# Patient Record
Sex: MALE | Race: OTHER | URBAN - METROPOLITAN AREA
[De-identification: names, ages, dates, MRNs, and addresses within clinical notes are randomized per-mention and may not be internally consistent; named-entity substitution may affect disease eponyms.]

---

## 2022-07-09 ENCOUNTER — TELEPHONE ENCOUNTER (OUTPATIENT)
Dept: URBAN - METROPOLITAN AREA CLINIC 121 | Facility: CLINIC | Age: 47
End: 2022-07-09

## 2022-07-10 ENCOUNTER — TELEPHONE ENCOUNTER (OUTPATIENT)
Dept: URBAN - METROPOLITAN AREA CLINIC 121 | Facility: CLINIC | Age: 47
End: 2022-07-10

## 2023-01-19 ENCOUNTER — WEB ENCOUNTER (OUTPATIENT)
Dept: URBAN - METROPOLITAN AREA CLINIC 63 | Facility: CLINIC | Age: 48
End: 2023-01-19

## 2023-01-25 ENCOUNTER — LAB OUTSIDE AN ENCOUNTER (OUTPATIENT)
Dept: URBAN - METROPOLITAN AREA CLINIC 63 | Facility: CLINIC | Age: 48
End: 2023-01-25

## 2023-01-25 ENCOUNTER — OFFICE VISIT (OUTPATIENT)
Dept: URBAN - METROPOLITAN AREA CLINIC 63 | Facility: CLINIC | Age: 48
End: 2023-01-25
Payer: COMMERCIAL

## 2023-01-25 VITALS
SYSTOLIC BLOOD PRESSURE: 128 MMHG | BODY MASS INDEX: 33.8 KG/M2 | WEIGHT: 255 LBS | DIASTOLIC BLOOD PRESSURE: 80 MMHG | OXYGEN SATURATION: 95 % | HEIGHT: 73 IN | TEMPERATURE: 97.6 F | HEART RATE: 87 BPM

## 2023-01-25 DIAGNOSIS — Z12.11 COLON CANCER SCREENING: ICD-10-CM

## 2023-01-25 DIAGNOSIS — K76.0 FATTY LIVER: ICD-10-CM

## 2023-01-25 DIAGNOSIS — R74.8 ABNORMAL LIVER ENZYMES: ICD-10-CM

## 2023-01-25 DIAGNOSIS — K21.9 GASTROESOPHAGEAL REFLUX DISEASE WITHOUT ESOPHAGITIS: ICD-10-CM

## 2023-01-25 PROCEDURE — 99204 OFFICE O/P NEW MOD 45 MIN: CPT | Performed by: PHYSICIAN ASSISTANT

## 2023-01-25 RX ORDER — ONDANSETRON HYDROCHLORIDE 4 MG/1
1 TABLET TABLET, FILM COATED ORAL
Qty: 2 | Refills: 0 | OUTPATIENT

## 2023-01-25 RX ORDER — POLYETHYLENE GLYCOL 3350, SODIUM CHLORIDE, SODIUM BICARBONATE, POTASSIUM CHLORIDE 420; 11.2; 5.72; 1.48 G/4L; G/4L; G/4L; G/4L
AS DIRECTED POWDER, FOR SOLUTION ORAL ONCE
Qty: 4000 | Refills: 0 | OUTPATIENT

## 2023-01-25 RX ORDER — FAMOTIDINE 20 MG/1
1 TABLET AS NEEDED TABLET, FILM COATED ORAL ONCE A DAY
Qty: 90 TABLET | Refills: 1 | OUTPATIENT

## 2023-01-25 NOTE — HPI-TODAY'S VISIT:
Devyn is a pleasant 47-year-old male who presents today for evaluation of abnormal liver enzymes  Labs dated 1/17/2023 showed a normal hemoglobin.  Platelets normal.  Normal renal function.  AST 41, ALT 82.  Alk phos and bilirubin normal.  TSH normal.  CT renal stone protocol in October 2017 showed no renal utero calculi.  No hydronephrosis.  Fatty infiltration of the liver.  Ultrasound dated 6/29/2022 showed hepatic steatosis  Denies EtOH use. Patient is colonoscopy naive. Notes 1-2 daily bowel movements without constipation or diarrhea. Chronic reflux and heartburn, not taking antacids. Denies nausea, vomiting, dysphagia, odynophagia, hematemesis, coffee ground emesis, abdominal pain, change in bowel habits, abnormal weight loss, BRBPR or melena. Denies family history of colon cancer or colon polyps. No other GI complaints at this time

## 2023-02-01 ENCOUNTER — LAB OUTSIDE AN ENCOUNTER (OUTPATIENT)
Dept: URBAN - METROPOLITAN AREA CLINIC 63 | Facility: CLINIC | Age: 48
End: 2023-02-01

## 2023-02-01 ENCOUNTER — TELEPHONE ENCOUNTER (OUTPATIENT)
Dept: URBAN - METROPOLITAN AREA CLINIC 63 | Facility: CLINIC | Age: 48
End: 2023-02-01

## 2023-02-01 LAB
IMMUNOGLOBULIN A: 269
IMMUNOGLOBULIN G: 1057
IMMUNOGLOBULIN M: 24

## 2023-02-05 LAB
A/G RATIO: 2
ABSOLUTE BASOPHILS: 39
ABSOLUTE EOSINOPHILS: 104
ABSOLUTE LYMPHOCYTES: 2074
ABSOLUTE MONOCYTES: 312
ABSOLUTE NEUTROPHILS: 3972
ACTIN (SMOOTH MUSCLE) ANTIBODY (IGG): <20
ALBUMIN: 4.7
ALKALINE PHOSPHATASE: 54
ALT (SGPT): 76
ANA SCREEN, IFA: NEGATIVE
AST (SGOT): 35
BASOPHILS: 0.6
BILIRUBIN, TOTAL: 0.9
BUN/CREATININE RATIO: (no result)
BUN: 15
CALCIUM: 9.8
CARBON DIOXIDE, TOTAL: 26
CERULOPLASMIN: 23
CHLORIDE: 103
CREATININE: 0.96
EGFR: 97
EOSINOPHILS: 1.6
FERRITIN, SERUM: 333
GLOBULIN, TOTAL: 2.3
GLUCOSE: 147
HEMATOCRIT: 45.3
HEMOGLOBIN: 15.4
HEP A AB, IGM: (no result)
HEP B CORE AB, IGM: (no result)
HEPATITIS A AB, TOTAL: REACTIVE
HEPATITIS B CORE AB TOTAL: (no result)
HEPATITIS B SURFACE AB IMMUNITY, QN: <5
HEPATITIS B SURFACE ANTIGEN: (no result)
HEPATITIS C ANTIBODY: (no result)
INR: 1
IRON BIND.CAP.(TIBC): 358
IRON SATURATION: 38
IRON: 135
LKM-1 ANTIBODY (IGG): <=20
LYMPHOCYTES: 31.9
MCH: 30.6
MCHC: 34
MCV: 89.9
MITOCHONDRIAL (M2) ANTIBODY: <=20
MONOCYTES: 4.8
MPV: 8.9
NEUTROPHILS: 61.1
PLATELET COUNT: 221
POTASSIUM: 4.1
PROTEIN, TOTAL: 7
PT: 10
RDW: 12.4
RED BLOOD CELL COUNT: 5.04
RHEUMATOID FACTOR: <14
SIGNAL TO CUT-OFF: 0.04
SJOGREN'S ANTIBODY (SS-A): (no result)
SJOGREN'S ANTIBODY (SS-B): (no result)
SODIUM: 140
WHITE BLOOD CELL COUNT: 6.5

## 2023-02-08 ENCOUNTER — WEB ENCOUNTER (OUTPATIENT)
Dept: URBAN - METROPOLITAN AREA SURGERY CENTER 4 | Facility: SURGERY CENTER | Age: 48
End: 2023-02-08

## 2023-02-10 ENCOUNTER — CLAIMS CREATED FROM THE CLAIM WINDOW (OUTPATIENT)
Dept: URBAN - METROPOLITAN AREA SURGERY CENTER 4 | Facility: SURGERY CENTER | Age: 48
End: 2023-02-10
Payer: COMMERCIAL

## 2023-02-10 DIAGNOSIS — K21.9 GASTROESOPHAGEAL REFLUX DISEASE WITHOUT ESOPHAGITIS: ICD-10-CM

## 2023-02-10 DIAGNOSIS — K22.89 OTHER SPECIFIED DISEASE OF ESOPHAGUS: ICD-10-CM

## 2023-02-10 DIAGNOSIS — K64.0 GRADE I INTERNAL HEMORRHOIDS: ICD-10-CM

## 2023-02-10 DIAGNOSIS — Z12.11 COLON CANCER SCREENING: ICD-10-CM

## 2023-02-10 DIAGNOSIS — K22.2 SCHATZKI'S RING: ICD-10-CM

## 2023-02-10 DIAGNOSIS — K29.70 GASTRITIS: ICD-10-CM

## 2023-02-10 DIAGNOSIS — K57.30 DIVERTCULOSIS OF LG INT W/O PERFORATION OR ABSCESS W/O BLEEDING: ICD-10-CM

## 2023-02-10 PROCEDURE — 43239 EGD BIOPSY SINGLE/MULTIPLE: CPT | Performed by: INTERNAL MEDICINE

## 2023-02-10 PROCEDURE — G0121 COLON CA SCRN NOT HI RSK IND: HCPCS | Performed by: INTERNAL MEDICINE

## 2023-02-10 RX ORDER — ONDANSETRON HYDROCHLORIDE 4 MG/1
1 TABLET TABLET, FILM COATED ORAL
Qty: 2 | Refills: 0 | Status: ACTIVE | COMMUNITY

## 2023-02-10 RX ORDER — POLYETHYLENE GLYCOL 3350, SODIUM CHLORIDE, SODIUM BICARBONATE, POTASSIUM CHLORIDE 420; 11.2; 5.72; 1.48 G/4L; G/4L; G/4L; G/4L
AS DIRECTED POWDER, FOR SOLUTION ORAL ONCE
Qty: 4000 | Refills: 0 | Status: ACTIVE | COMMUNITY

## 2023-02-10 RX ORDER — FAMOTIDINE 20 MG/1
1 TABLET AS NEEDED TABLET, FILM COATED ORAL ONCE A DAY
Qty: 90 TABLET | Refills: 1 | Status: ACTIVE | COMMUNITY

## 2023-02-13 PROBLEM — 398050005 DIVERTICULAR DISEASE OF COLON: Status: ACTIVE | Noted: 2023-02-13

## 2023-02-13 PROBLEM — 66889002 SCHATZKI'S RING: Status: ACTIVE | Noted: 2023-02-13

## 2023-02-13 PROBLEM — 4556007 GASTRITIS: Status: ACTIVE | Noted: 2023-02-13

## 2023-03-03 ENCOUNTER — OFFICE VISIT (OUTPATIENT)
Dept: URBAN - METROPOLITAN AREA CLINIC 63 | Facility: CLINIC | Age: 48
End: 2023-03-03
Payer: COMMERCIAL

## 2023-03-03 VITALS
SYSTOLIC BLOOD PRESSURE: 126 MMHG | DIASTOLIC BLOOD PRESSURE: 84 MMHG | WEIGHT: 249.8 LBS | HEIGHT: 73 IN | OXYGEN SATURATION: 96 % | TEMPERATURE: 97.8 F | BODY MASS INDEX: 33.11 KG/M2 | HEART RATE: 102 BPM

## 2023-03-03 DIAGNOSIS — K22.70 BARRETT'S ESOPHAGUS WITHOUT DYSPLASIA: ICD-10-CM

## 2023-03-03 DIAGNOSIS — R76.8 LOW SERUM IGM FOR AGE: ICD-10-CM

## 2023-03-03 DIAGNOSIS — K21.9 GASTROESOPHAGEAL REFLUX DISEASE WITHOUT ESOPHAGITIS: ICD-10-CM

## 2023-03-03 DIAGNOSIS — Z12.11 COLON CANCER SCREENING: ICD-10-CM

## 2023-03-03 DIAGNOSIS — R74.8 ABNORMAL LIVER ENZYMES: ICD-10-CM

## 2023-03-03 DIAGNOSIS — K76.0 FATTY LIVER: ICD-10-CM

## 2023-03-03 PROCEDURE — 99214 OFFICE O/P EST MOD 30 MIN: CPT | Performed by: PHYSICIAN ASSISTANT

## 2023-03-03 RX ORDER — MULTIVIT-MIN/IRON/FOLIC ACID/K 18-600-40
AS DIRECTED CAPSULE ORAL
Status: ACTIVE | COMMUNITY

## 2023-03-03 RX ORDER — MAGNESIUM OXIDE/MAG AA CHELATE 300 MG
1 CAPSULE WITH A MEAL CAPSULE ORAL ONCE A DAY
Status: ACTIVE | COMMUNITY

## 2023-03-03 RX ORDER — B-COMPLEX WITH VITAMIN C
1 TABLET TABLET ORAL ONCE A DAY
Status: ACTIVE | COMMUNITY

## 2023-03-03 RX ORDER — OMEPRAZOLE 20 MG/1
1 CAPSULE 30 MINUTES BEFORE MORNING MEAL CAPSULE, DELAYED RELEASE ORAL ONCE A DAY
Qty: 90 CAPSULE | Refills: 3 | OUTPATIENT

## 2023-03-03 NOTE — HPI-TODAY'S VISIT:
Devyn is a pleasant 47-year-old male who presents today for a procedure follow up.  Ultrasound dated 1/31/2023 showed hepatomegaly and severe diffuse fatty infiltration of the liver.  Pancreas obscured by overlying bowel gas.    FibroScan dated 1/31/2023 showed S3 and F0  Labs dated 2/1/2023 showed IgM 24 but otherwise normal immunoglobulins.  Iron studies normal.  Ferritin normal.  No immunity to hep B.  Glucose 147, ALT 76 otherwise normal CMP.  Normal CBC.  PT/INR normal.  Hep A antibody total reactive but IgM negative.  HCV negative.  Remaining autoimmune markers negative  EGD dated 2/10/2023 showed an irregular Z-line.  Widely patent Schatzki's ring was biopsied in the lower third of the esophagus.  Small hiatal hernia.  Mild gastritis.  Normal duodenum.  Congested mucosa was found at the GE junction. Focal minimal chronic gastritis.  Negative for H. pylori.  Esophageal and gastric mucosa with mild chronic inflammation, reflux-type changes and patchy intestinal metaplasia compatible with Rojas's esophagus.  Negative for dysplasia or malignancy.  Colonoscopy dated 2/10/2023 showed diverticulosis in the sigmoid colon.  Nonbleeding internal hemorrhoids.  No specimens collected.  Repeat colonoscopy in 10 years per protocol  Labs dated 1/17/2023 showed a normal hemoglobin.  Platelets normal.  Normal renal function.  AST 41, ALT 82.  Alk phos and bilirubin normal.  TSH normal.  CT renal stone protocol in October 2017 showed no renal utero calculi.  No hydronephrosis.  Fatty infiltration of the liver.  Ultrasound dated 6/29/2022 showed hepatic steatosis  No issues after procedure. Denies EtOH use. Notes 1-2 daily bowel movements without constipation or diarrhea. Chronic reflux and heartburn, well controlled on famotidine. Denies nausea, vomiting, dysphagia, odynophagia, hematemesis, coffee ground emesis, abdominal pain, change in bowel habits, abnormal weight loss, BRBPR or melena. Denies family history of colon cancer or colon polyps. No other GI complaints at this time

## 2023-03-06 PROBLEM — 266435005: Status: ACTIVE | Noted: 2023-01-25

## 2023-03-06 PROBLEM — 302914006: Status: ACTIVE | Noted: 2023-03-03

## 2023-03-06 PROBLEM — 197321007: Status: ACTIVE | Noted: 2023-01-24

## 2023-05-30 ENCOUNTER — DASHBOARD ENCOUNTERS (OUTPATIENT)
Age: 48
End: 2023-05-30

## 2023-06-02 ENCOUNTER — OFFICE VISIT (OUTPATIENT)
Dept: URBAN - METROPOLITAN AREA CLINIC 63 | Facility: CLINIC | Age: 48
End: 2023-06-02

## 2023-06-02 RX ORDER — OMEPRAZOLE 20 MG/1
1 CAPSULE 30 MINUTES BEFORE MORNING MEAL CAPSULE, DELAYED RELEASE ORAL ONCE A DAY
Qty: 90 CAPSULE | Refills: 3 | COMMUNITY

## 2023-06-02 RX ORDER — MAGNESIUM OXIDE/MAG AA CHELATE 300 MG
1 CAPSULE WITH A MEAL CAPSULE ORAL ONCE A DAY
COMMUNITY

## 2023-06-02 RX ORDER — B-COMPLEX WITH VITAMIN C
1 TABLET TABLET ORAL ONCE A DAY
COMMUNITY

## 2023-06-02 RX ORDER — MULTIVIT-MIN/IRON/FOLIC ACID/K 18-600-40
AS DIRECTED CAPSULE ORAL
COMMUNITY